# Patient Record
Sex: FEMALE | Race: WHITE | NOT HISPANIC OR LATINO | Employment: OTHER | URBAN - METROPOLITAN AREA
[De-identification: names, ages, dates, MRNs, and addresses within clinical notes are randomized per-mention and may not be internally consistent; named-entity substitution may affect disease eponyms.]

---

## 2019-04-15 ENCOUNTER — HISTORICAL (OUTPATIENT)
Dept: ADMINISTRATIVE | Facility: HOSPITAL | Age: 69
End: 2019-04-15

## 2019-06-10 ENCOUNTER — HISTORICAL (OUTPATIENT)
Dept: ADMINISTRATIVE | Facility: HOSPITAL | Age: 69
End: 2019-06-10

## 2022-04-30 NOTE — OP NOTE
Patient:   Yenny Miranda            MRN: [id number]            FIN: [id number]               Age:   68 years     Sex:  Female     :  1950   Associated Diagnoses:   None   Author:   Felisha MONACO, Rebecca HAIR      NAME: Yenny Miranda  SURGEON:  Rebecca Baeza MD    YOB: 1950  DATE OF SURGERY:4/15/2019    PREOPERATIVE DIAGNOSIS:  Cataract, left eye.    POSTOPERATIVE DIAGNOSIS:  Cataract, left eye.    PROCEDURE:  Cataract extraction with intraocular lens placement, left eye.    HISTORY:  The patient is a 68-year-old female, who presented to the clinic with a 2+ nuclear sclerotic, 2+ CC cataract causing difficulty in patient's activities of daily living. After thorough discussion of risks, benefits, alternatives and complications, the patient expressed understanding and wished to proceed with cataract extraction.  Goal of distance verified at bedside.  Vision blue will be used to aid in visualization due to visualization secondary to cortical changes.     PROCEDURE IN DETAIL:  On the day of surgery patient was brought to the preoperative holding area, where patient was given five drops each of phenylephrine, tropicamide and Cyclopentolate into the operative eye.  Patient was then brought to the operating room where patient was given Marcaine drops into the operative eye.  Patient was prepped and draped in normal sterile fashion.  A lid speculum was inserted.  Using operating microscope, 1.0-mm keratome was used to create a side port wound through which 1% epishugarcainecaine was injected, followed by air, Vision Blue, BSS, then Viscoat.  A 2.65 mm keratome was used to create triplanar phacoemulsification wound, through which continuous tear capsulorrhexis was performed using Utrata forceps and a cystotome. Hydrodissection and delineation were performed until lens was rotating freely in capsular bag.  Phacoemulsification was carried out in divide and conquer fashion to remove nuclear  and epinuclear fragments.  I/A was used to remove cortex carefully.  Healon was injected into the capsular bag, which was found to be free of tears or defect.  A ZCBOO +21.50 diopter lens was inserted and carefully rotated into place.  Viscoelastic was removed from the eye.  Wounds were sealed using hydration. Lens was in excellent position at end of case.  Vigamox was placed into the patient's eye, which was patched and shielded.  The patient tolerated the procedure well and will followup tomorrow in clinic.          Pt reports an allergy to steroids, given orally.  Reaction is redness of face, but nothing life threatening.  She has never tried taking Benadryl or antihistamines for this.  D/w her we will need to use Prednisolone drops to aid in recovery, and she is willing to proceed knowing that she may have a red face for some time.

## 2022-04-30 NOTE — OP NOTE
Patient:   Yenny Miranda            MRN: [id number]            FIN: [id number]               Age:   68 years     Sex:  Female     :  1950   Associated Diagnoses:   None   Author:   Felisha MONACO, Rebecca HAIR      NAME: Yenny Miranda  SURGEON:  Rebecca Baeza MD    YOB: 1950  DATE OF SURGERY:6/10/2019    PREOPERATIVE DIAGNOSIS:  Cataract, right eye.    POSTOPERATIVE DIAGNOSIS:  Cataract, right eye.    PROCEDURE:  Cataract extraction with intraocular lens placement, right eye.    HISTORY:  The patient is a 68-year-old female, who presented to the clinic with a 2+ nuclear sclerotic, 2+ CC cataract causing difficulty in patient's activities of daily living. After thorough discussion of risks, benefits, alternatives and complications, the patient expressed understanding and wished to proceed with cataract extraction.  Goal of distance verified at bedside.  Vision blue will be used to aid in visualization due to visualization secondary to cortical changes.  Epishugarcaine for poor dilation preoperatively.    PROCEDURE IN DETAIL:  On the day of surgery patient was brought to the preoperative holding area, where patient was given five drops each of phenylephrine, tropicamide and Cyclopentolate into the operative eye.  Patient was then brought to the operating room where patient was given Marcaine drops into the operative eye.  Patient was prepped and draped in normal sterile fashion.  A lid speculum was inserted.  Using operating microscope, 1.0-mm keratome was used to create a side port wound through which 1% epishugarcainecaine was injected, followed by air, Vision Blue, BSS, then Viscoat.  A 2.65 mm keratome was used to create triplanar phacoemulsification wound, through which continuous tear capsulorrhexis was performed using Utrata forceps and a cystotome. Hydrodissection and delineation were performed until lens was rotating freely in capsular bag.  Phacoemulsification was carried  out in divide and conquer fashion to remove nuclear and epinuclear fragments.  I/A was used to remove cortex carefully.  Healon was injected into the capsular bag, which was found to be free of tears or defect.  A ZCBOO +21.50 diopter lens was inserted and carefully rotated into place.  Viscoelastic was removed from the eye.  Wounds were sealed using hydration. Lens was in excellent position at end of case.  Vigamox and Maxitrol ointment were placed into the patient's eye, which was patched and shielded.  The patient tolerated the procedure well and will followup tomorrow in clinic.          Pt reports an allergy to steroids, given orally.  Reaction is redness of face, but nothing life threatening.  She has never tried taking Benadryl or antihistamines for this.  D/w her we will need to use Prednisolone drops to aid in recovery, and she is willing to proceed knowing that she may have a red face for some time.